# Patient Record
Sex: MALE | Race: WHITE | Employment: STUDENT | URBAN - METROPOLITAN AREA
[De-identification: names, ages, dates, MRNs, and addresses within clinical notes are randomized per-mention and may not be internally consistent; named-entity substitution may affect disease eponyms.]

---
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
## 2021-03-21 ENCOUNTER — NURSE TRIAGE (OUTPATIENT)
Dept: NURSING | Facility: CLINIC | Age: 21
End: 2021-03-21

## 2021-03-21 NOTE — TELEPHONE ENCOUNTER
"Meño asks for his last tetanus shot. Sustained an injury on his left index finger, about half an inch cut.      Date Status Dose VIS Date Route Site  Lot# Given By Verified By   5/10/2013 Given 0.5mL 01/24/2012, Given Today           States that wound is a \"little dirty,\"  Cleaned with water and peroxide  Pain is 7/10    Per protocol, advised to be seen within 3 days. Care advice reviewed - clean wound, watch out for infection, apply OTC abx ointment, be seen within 3 days for booster tetanus shot. Patient verbalizes understanding, states that he is currently in [place]. Advised to call back with further questions/concerns.       Vita Ceron RN/[place] Nurse Advisor      Additional Information    Negative: [1] Major bleeding (e.g., spurting blood) AND [2] can't be stopped    Negative: Amputated finger    Negative: Skin is split open or gaping (or length > 1/2 inch or 12 mm)    Negative: [1] Bleeding AND [2] won't stop after 10 minutes of direct pressure (using correct technique)    Negative: [1] Dirt in the wound AND [2] not removed with 15 minutes of scrubbing    Negative: High pressure injection injury (e.g., from grease gun or paint gun, usually work-related)    Negative: Looks like a broken bone (e.g., crooked or deformed)    Negative: Looks like a dislocated joint (e.g., crooked or deformed)    Negative: [1] Fingernail is partially torn AND [2] from crush injury  (Exception: torn nail from catching it on something)    Negative: [1] Cut AND [2] numbness (loss of sensation) of finger    Negative: [1] Cut AND [2] finger joint can't be opened (straightened) or closed (bent) completely    Negative: Sounds like a serious injury to the triager    Negative: [1] SEVERE pain AND [2] not improved 2 hours after pain medicine/ice packs    Negative: [1] MODERATE-SEVERE pain AND [2] blood present under a nail    Negative: Fingernail is completely torn off (fingernail avulsion)    Negative: Base of fingernail has " popped out of the skin fold (cuticle)    Negative: Suspicious history for the injury    Negative: Large swelling or bruise    Negative: Finger joint can't be opened (straightened) or closed (bent) completely    (Note: injured person should be able to do this without assistance)    Negative: [1] No prior tetanus shots (or is not fully vaccinated) AND [2] any wound (e.g., cut, scrape)    [1] Last tetanus shot > 5 years ago AND [2] DIRTY cut or scrape    Protocols used: FINGER INJURY-A-AH
